# Patient Record
Sex: FEMALE | Race: WHITE
[De-identification: names, ages, dates, MRNs, and addresses within clinical notes are randomized per-mention and may not be internally consistent; named-entity substitution may affect disease eponyms.]

---

## 2022-06-08 ENCOUNTER — HOSPITAL ENCOUNTER (OUTPATIENT)
Dept: HOSPITAL 75 - ORTHO | Age: 82
End: 2022-06-08
Attending: ORTHOPAEDIC SURGERY
Payer: MEDICARE

## 2022-06-08 DIAGNOSIS — S73.004A: Primary | ICD-10-CM
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
## 2022-06-08 PROCEDURE — 73502 X-RAY EXAM HIP UNI 2-3 VIEWS: CPT

## 2022-06-08 PROCEDURE — 99203 OFFICE O/P NEW LOW 30 MIN: CPT

## 2022-06-08 NOTE — DIAGNOSTIC IMAGING REPORT
INDICATION: 

Right hip pain.



TIME OF EXAM: 

2:00 PM.



COMPARISON: 

No prior studies are available for comparison.



FINDINGS:

Two views of the right hip demonstrate posterior hip dislocation.

The femoral head component is located laterally to the acetabular

component. The long stem femoral component is centered in the

medullary canal. No acute bony abnormality is identified. The

rami are intact.



IMPRESSION: 

Right hip dislocation.



Dictated by: 



  Dictated on workstation # [id number]